# Patient Record
Sex: MALE | Race: WHITE | NOT HISPANIC OR LATINO | Employment: FULL TIME | URBAN - METROPOLITAN AREA
[De-identification: names, ages, dates, MRNs, and addresses within clinical notes are randomized per-mention and may not be internally consistent; named-entity substitution may affect disease eponyms.]

---

## 2022-11-08 ENCOUNTER — OFFICE VISIT (OUTPATIENT)
Dept: URGENT CARE | Facility: URGENT CARE | Age: 57
End: 2022-11-08
Payer: OTHER MISCELLANEOUS

## 2022-11-08 VITALS
HEART RATE: 87 BPM | OXYGEN SATURATION: 95 % | SYSTOLIC BLOOD PRESSURE: 130 MMHG | RESPIRATION RATE: 16 BRPM | DIASTOLIC BLOOD PRESSURE: 82 MMHG | TEMPERATURE: 97.6 F

## 2022-11-08 DIAGNOSIS — S61.210A LACERATION OF RIGHT INDEX FINGER WITHOUT FOREIGN BODY WITHOUT DAMAGE TO NAIL, INITIAL ENCOUNTER: Primary | ICD-10-CM

## 2022-11-08 PROCEDURE — 12001 RPR S/N/AX/GEN/TRNK 2.5CM/<: CPT | Performed by: PHYSICIAN ASSISTANT

## 2022-11-08 NOTE — PROGRESS NOTES
EMPLOYER: [employer]  AMRITA: Dryer vent to right 2nd digit  RICKY: 2pm 11/8/22      SUBJECTIVE:     Chief Complaint   Patient presents with     Urgent Care     Laceration     Right hand laceration around 2pm-Work injury-Tetanus approx 2019 per patient      Pablo Yeager is a 57 year old male who presents to the clinic with a laceration on the right finger sustained 2 hour(s) ago.  This is a work related injury.    Mechanism of injury: sheet metal.    Associated symptoms: Denies numbness, weakness, or loss of function  Last tetanus booster within 10 years: yes    EXAM:   The patient appears today in alert,no apparent distress distress  VITALS: /82   Pulse 87   Temp 97.6  F (36.4  C) (Tympanic)   Resp 16   SpO2 95%     Size of laceration: 2 centimeters  Characteristics of the laceration: bleeding- mild  Tendon function intact: yes  Sensation to light touch intact: yes  Pulses intact: not applicable  Picture included in patient's chart: no    Assessment:  Laceration of right index finger without foreign body without damage to nail, initial encounter      (S61.210A) Laceration of right index finger without foreign body without damage to nail, initial encounter  (primary encounter diagnosis)  Plan: REPAIR SUPERFICIAL, WOUND BODY < =2.5CM        PLAN:  PROCEDURE NOTE::  Wound was locally injected with 1 cc's of Lidocaine 2% plain  Wound cleaned with HIBICLENS  Wound cleaned with betadine/saline solution  Laceration was closed using 5 4-0 sutures interrupted sutures  After care instructions:  Keep wound clean and dry for the next 24-48 hours  Sutures out in 10 days minimum  Signs of infection discussed today  Apply anti-bacterial ointment for 5 days  Discussed the probability of scarring

## 2022-11-11 ENCOUNTER — TELEPHONE (OUTPATIENT)
Dept: URGENT CARE | Facility: URGENT CARE | Age: 57
End: 2022-11-11

## 2022-11-11 NOTE — TELEPHONE ENCOUNTER
I just printed two copies of the Work Comp Workability report for the patient.  Patient can  the copies (one for his own records; the other, for the employer).      Please notify patient of this plan.      Renato Miranda MD

## 2022-11-11 NOTE — TELEPHONE ENCOUNTER
Rose Lynn -clinic visit facilitator called patient  And left message that letter is available for pickup at the [facility]

## 2022-11-11 NOTE — LETTER
[facility] [place]  [address]  [address]  [place] [address]  [phone]        2022    REPORT OF WORK ABILITY    PATIENT DATA  Employee Name: Pablo Yeager        : 1965   (Not on file)  Work related injury: YES  Today's date: 2022 (Patient was seen at the [facility] on 2022.)  Date of injury: 2022    PROVIDER EVALUATION: Please fill in as needed.  Please give copy to employee for employer.  1. Diagnosis: Laceration  2. Treatment: of the right second finger.   3. Medication: no prescriptions were given to the patient.    NOTE: When ordering a medication, [place] Rules require Work Comp or WC on prescriptions.  4. Return to work date: on 2022, with no restrictions.      RESTRICTIONS: Unlimited unless listed.  Restrictions apply to home and leisure also.  If work within restrictions is not available, the employee is totally disabled.  Provider comments: Return between  to 2022, to have the stitches removed.    Medical Examiner: Renato Miranda MD    (Patient was examined and evaluated by the physician assistant Yuan Casas on 2022)  License or registration: [id number] (for Renato Miranda MD).      Next appointment: Return between  to 2022, to have the stitches removed.      CC: Employer, Managed Care Plan/Payor, Patient

## 2022-11-11 NOTE — TELEPHONE ENCOUNTER
Patient called stating that his work is requesting a letter for work.     After receiving a laceration to patient's hand patient told his work. His work requested that he stay home from work for 3 days even though patient was not medically instructed to do so.     Patient's work is now requested a doctor's note be provided for the 3 days they requested patient be off from work.     Elena Rodriguez RN on 11/11/2022 at 11:59 AM